# Patient Record
Sex: MALE | Race: WHITE | Employment: STUDENT | URBAN - METROPOLITAN AREA
[De-identification: names, ages, dates, MRNs, and addresses within clinical notes are randomized per-mention and may not be internally consistent; named-entity substitution may affect disease eponyms.]

---

## 2019-10-25 ENCOUNTER — NURSE TRIAGE (OUTPATIENT)
Dept: NURSING | Facility: CLINIC | Age: 19
End: 2019-10-25

## 2019-10-26 NOTE — TELEPHONE ENCOUNTER
"Patient states he had 3 immunizations yesterday and is concerned about a reaction to varicella vaccine received yesterday.  States also had  HPV and Hep A vaccine yesterday.  Currently reports temperature of \"100.0\" (O).  Describes area of redness that has swelling at the site of the varicella injection.  Denies red streak.   Denies difficulty breathing. Denies wheezing.  States felt \"disoriented yesterday when he awoke from a nap.\"  Alert and oriented at this time.    Also states has had a cough x 1.5 weeks productive with green sputum. Also reports frequent nose bleeds past week with blood tinged sputum.  Reports headache today. Describes as \"4\" on a 1-10 pain scale.  Occasional mild chills reported.  Denies sore throat.    History of Willebrand's disease by report.  Caller reiterates his concern about an immunization reaction.    Protocol-  Immunization Reactions  Care advice reviewed.   Disposition-  Home Care. Reviewed care specific to Chickenpox Protocol.  Caller states understanding of the recommended disposition.   Caller states he is enroute now to [facility] ED for evaluation.  Advised to call back if further questions or concerns.     JASPAL Roblero RN  [facility]     Additional Information    Negative: Severe difficulty breathing (e.g., struggling for each breath, speaks in single words)    Negative: Bluish (or gray) lips or face now    Negative: [1] Difficulty breathing AND [2] exposure to flames, smoke, or fumes    Negative: [1] Stridor AND [2] difficulty breathing    Negative: Sounds like a life-threatening emergency to the triager    Negative: Chest pain  (Exception: MILD central chest pain, present only when coughing)    Negative: Difficulty breathing    Negative: Patient sounds very sick or weak to the triager    Negative: [1] Coughed up blood AND [2] > 1 tablespoon (15 ml) (Exception: blood-tinged sputum)    Negative: Fever > 103 F (39.4 C)    Negative: [1] Fever > 101 F (38.3 C) " AND [2] age > 60    Negative: [1] Fever > 100.0 F (37.8 C) AND [2] bedridden (e.g., nursing home patient, CVA, chronic illness, recovering from surgery)    Negative: [1] Fever > 100.0 F (37.8 C) AND [2] diabetes mellitus or weak immune system (e.g., HIV positive, cancer chemo, splenectomy, chronic steroids)    Negative: Wheezing is present    Negative: SEVERE coughing spells (e.g., whooping sound after coughing, vomiting after coughing)    Negative: [1] Continuous (nonstop) coughing interferes with work or school AND [2] no improvement using cough treatment per Care Advice    Negative: Coughing up mt-colored (reddish-brown) sputum    Negative: Fever present > 3 days (72 hours)    Negative: [1] Fever returns after gone for over 24 hours AND [2] symptoms worse or not improved    Negative: [1] Using nasal washes and pain medicine > 24 hours AND [2] sinus pain (around cheekbone or eye) persists    Negative: Earache    Negative: [1] Known COPD or other severe lung disease (i.e., bronchiectasis, cystic fibrosis, lung surgery) AND [2] worsening symptoms (i.e., increased sputum purulence or amount, increased breathing difficulty    Negative: Cough has been present for > 3 weeks    Negative: [1] Nasal discharge AND [2] present > 10 days    Negative: [1] Difficulty with breathing or swallowing AND [2] starts within 2 hours after injection    Negative: Difficult to awaken or acting confused (e.g., disoriented, slurred speech)    Negative: Unresponsive, passed out, or very weak    Negative: Sounds like a life-threatening emergency to the triager    Negative: Fever > 104 F (40 C)    Negative: [1] Fever > 101 F (38.3 C) AND [2] age > 60    Negative: [1] Fever > 100.0 F (37.8 C) AND [2] bedridden (e.g., nursing home patient, CVA, chronic illness, recovering from surgery)    Negative: [1] Fever > 100.0 F (37.8 C) AND [2] diabetes mellitus or weak immune system (e.g., HIV positive, cancer chemo, splenectomy, chronic steroids)     Negative: [1] Measles vaccine rash (onset day 6-12) AND [2] purple or blood-colored    Negative: Sounds like a severe, unusual reaction to the triager    Negative: [1] Redness or red streak around the injection site AND [2] begins > 48 hours after shot AND [3] fever    Negative: [1] Redness or red streak around the injection site AND [2] begins > 48 hours after shot AND [3] no fever  (Exception: red area < 1 inch or 2.5 cm wide)    Negative: Fever present > 3 days (72 hours)    Negative: [1] Over 3 days (72 hours) since shot AND [2] redness, swelling or pain getting worse    Negative: [1] Smallpox vaccine and [2] eye pain, eye redness, or rash on eyelids    Negative: [1] Pain, tenderness, or swelling at the injection site AND [2] persists > 3 days    Negative: [1] Measles vaccine rash (onset day 6-12) AND [2] persists > 3 days    Negative: [1] Deep lump follows (in 2 to 8 weeks) Td or TDaP  shot AND [2] becomes tender to the touch    Chickenpox (varicella) vaccine reactions    Negative: Immunization needed, questions about    Negative: Injection site reaction to any vaccine    Negative: [1] Vaccines for travel, questions about AND [2] no current symptoms    Negative: Anthrax vaccine reactions    Protocols used: COUGH - ACUTE [id number], IMMUNIZATION [id number]